# Patient Record
Sex: MALE | Race: ASIAN | NOT HISPANIC OR LATINO
[De-identification: names, ages, dates, MRNs, and addresses within clinical notes are randomized per-mention and may not be internally consistent; named-entity substitution may affect disease eponyms.]

---
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
## 2020-01-13 ENCOUNTER — APPOINTMENT (OUTPATIENT)
Dept: DERMATOLOGY | Facility: CLINIC | Age: 20
End: 2020-01-13

## 2021-08-11 ENCOUNTER — APPOINTMENT (OUTPATIENT)
Dept: DERMATOLOGY | Facility: CLINIC | Age: 21
End: 2021-08-11
Payer: COMMERCIAL

## 2021-08-11 DIAGNOSIS — L73.0 ACNE KELOID: ICD-10-CM

## 2021-08-11 DIAGNOSIS — L70.0 ACNE VULGARIS: ICD-10-CM

## 2021-08-11 PROCEDURE — 99214 OFFICE O/P EST MOD 30 MIN: CPT

## 2021-08-11 RX ORDER — DOXYCYCLINE HYCLATE 100 MG/1
100 TABLET ORAL
Qty: 42 | Refills: 0 | Status: ACTIVE | COMMUNITY
Start: 2021-08-11 | End: 1900-01-01

## 2021-08-11 RX ORDER — CLINDAMYCIN PHOSPHATE 10 MG/ML
1 LOTION TOPICAL
Qty: 1 | Refills: 5 | Status: ACTIVE | COMMUNITY
Start: 2019-01-04 | End: 1900-01-01

## 2021-08-11 RX ORDER — TRETINOIN 0.25 MG/G
0.03 CREAM TOPICAL
Qty: 1 | Refills: 2 | Status: ACTIVE | COMMUNITY
Start: 2021-08-11 | End: 1900-01-01

## 2022-01-06 ENCOUNTER — APPOINTMENT (OUTPATIENT)
Dept: DERMATOLOGY | Facility: CLINIC | Age: 22
End: 2022-01-06

## 2022-09-14 ENCOUNTER — APPOINTMENT (OUTPATIENT)
Dept: DERMATOLOGY | Facility: CLINIC | Age: 22
End: 2022-09-14

## 2022-11-03 ENCOUNTER — EMERGENCY (EMERGENCY)
Facility: HOSPITAL | Age: 22
LOS: 1 days | Discharge: HOME | End: 2022-11-03
Admitting: EMERGENCY MEDICINE

## 2022-11-03 VITALS
RESPIRATION RATE: 14 BRPM | OXYGEN SATURATION: 100 % | DIASTOLIC BLOOD PRESSURE: 64 MMHG | HEART RATE: 70 BPM | TEMPERATURE: 99 F | SYSTOLIC BLOOD PRESSURE: 136 MMHG

## 2022-11-03 PROCEDURE — 12002 RPR S/N/AX/GEN/TRNK2.6-7.5CM: CPT

## 2022-11-03 PROCEDURE — 99283 EMERGENCY DEPT VISIT LOW MDM: CPT | Mod: 25

## 2022-11-03 RX ORDER — LIDOCAINE/EPINEPHR/TETRACAINE 4-0.09-0.5
1 GEL WITH PREFILLED APPLICATOR (ML) TOPICAL ONCE
Refills: 0 | Status: COMPLETED | OUTPATIENT
Start: 2022-11-03 | End: 2022-11-03

## 2022-11-03 RX ADMIN — Medication 1 APPLICATION(S): at 21:03

## 2022-11-03 NOTE — ED PROVIDER NOTE - PATIENT PORTAL LINK FT
You can access the FollowMyHealth Patient Portal offered by [facility] by registering at the following website: [email or web address]. By joining [facility]’s FollowMyHealth portal, you will also be able to view your health information using other applications (apps) compatible with our system.

## 2022-11-03 NOTE — ED PROVIDER NOTE - CARE PLAN
1 Principal Discharge DX:	Injury, head  Secondary Diagnosis:	Superficial laceration of scalp, initial encounter

## 2022-11-03 NOTE — ED PROVIDER NOTE - NSFOLLOWUPINSTRUCTIONS_ED_ALL_ED_FT
Follow up with your PMD within 48-72 hours.  Rest, Take Tylenol 650mg 1 tab every 4-6 hours as needed for pain . You may have a headache associated with nausea in the next few hours/days. This is called a concussion and does not warrant return to the ED unless you develop significant worsening of pain, profuse vomiting, dizziness, changes in vision, difficulty walking/speaking,  weakness or numbness to your extremities. You should return to the ER or go to an urgent for staple removal in 10 days.

## 2022-11-03 NOTE — ED PROVIDER NOTE - NS ED MD DISPO DISCHARGE
I performed a history and physical exam of the patient and discussed their management with the resident. I reviewed the resident's note and agree with the documented findings and plan of care.  attn - see MDM
Home

## 2022-11-03 NOTE — ED PROVIDER NOTE - PHYSICAL EXAMINATION
Vital signs reviewed.   CONSTITUTIONAL: Well-appearing; well-nourished; in no apparent distress. Non-toxic appearing.   HEAD: Normocephalic,contusion to superior aspect of head w/ 4.5cm linear laceration across, no active bleeding  EYES: PERRL, EOM intact, conjunctiva and sclera WNL.  NECK/LYMPH: Supple; non-tender  CARD: Normal S1, S2; RRR  RESP: Normal chest excursion with respiration; breath sounds clear and equal bilaterally; no wheezes, rhonchi, or rales.  EXT/MS: moves all extremities; no spinal tenderness, full neck ROM, distal pulses intact   SKIN: Normal for age and race; warm; 4.5cm linear laceration to superior aspect of head  NEURO: Awake, alert, oriented x 3, no gross deficits, CN II-XII grossly intact, normal coordination, negative Romberg, normal gait, no motor or sensory deficit noted.  PSYCH: Normal mood; appropriate affect.

## 2022-11-03 NOTE — ED PROVIDER NOTE - OBJECTIVE STATEMENT
22-year-old male no stated past medical history, here complaining of head injury with laceration to top of scalp sustained 1 hour ago.  Patient states he had a mechanical trip and fall, grazing the top of his head along a sharp corner.  States it was not a metal surface.  Did not lose consciousness and recalls the entire event.  Called out for his father who came to assist him with bleeding from wound.  No active bleeding from the wound now.  Patient denies headache, nausea, vomiting, vision changes, ataxia, dizziness, numbness/tingling, or any other complaints.

## 2022-11-03 NOTE — ED PROVIDER NOTE - CLINICAL SUMMARY MEDICAL DECISION MAKING FREE TEXT BOX
22-year-old male with no medical history here with laceration to top of head status post head injury secondary to a mechanical fall.  No LOC, no symptoms at present including headache, vision changes, nausea ,vomiting.  Physical exam consistent with 4.5 cm linear laceration to superior aspect of head.  Tetanus up-to-date.  Neuro exam within normal limits, plan to apply LET for anesthesia prior to laceration repair with staples.  Return precautions given for head trauma.

## 2022-11-04 NOTE — ED POST DISCHARGE NOTE - REASON FOR FOLLOW-UP
Telephone follow up request:  Pt seen in ED for head injury and laceration.  Contact pt to see how he is feeling.  Patient should return to ED for headache, nausea or vomiting.  If patient is feeling well, follow up in 10 days for staple removal. Other

## 2023-06-09 ENCOUNTER — NON-APPOINTMENT (OUTPATIENT)
Age: 23
End: 2023-06-09